# Patient Record
Sex: MALE | Race: WHITE | NOT HISPANIC OR LATINO | Employment: FULL TIME | URBAN - METROPOLITAN AREA
[De-identification: names, ages, dates, MRNs, and addresses within clinical notes are randomized per-mention and may not be internally consistent; named-entity substitution may affect disease eponyms.]

---

## 2022-04-06 ENCOUNTER — TELEPHONE (OUTPATIENT)
Dept: SCHEDULING | Facility: IMAGING CENTER | Age: 27
End: 2022-04-06

## 2022-04-18 ENCOUNTER — OFFICE VISIT (OUTPATIENT)
Dept: MEDICAL GROUP | Facility: PHYSICIAN GROUP | Age: 27
End: 2022-04-18
Payer: COMMERCIAL

## 2022-04-18 VITALS
DIASTOLIC BLOOD PRESSURE: 80 MMHG | HEART RATE: 69 BPM | HEIGHT: 74 IN | WEIGHT: 198 LBS | BODY MASS INDEX: 25.41 KG/M2 | SYSTOLIC BLOOD PRESSURE: 134 MMHG | TEMPERATURE: 97.7 F | OXYGEN SATURATION: 95 %

## 2022-04-18 DIAGNOSIS — R03.0 ELEVATED BLOOD PRESSURE READING IN OFFICE WITHOUT DIAGNOSIS OF HYPERTENSION: ICD-10-CM

## 2022-04-18 DIAGNOSIS — L65.9 HAIR LOSS: ICD-10-CM

## 2022-04-18 DIAGNOSIS — F41.9 ANXIETY AND DEPRESSION: ICD-10-CM

## 2022-04-18 DIAGNOSIS — Z00.00 HEALTHCARE MAINTENANCE: ICD-10-CM

## 2022-04-18 DIAGNOSIS — Z23 NEED FOR VACCINATION: ICD-10-CM

## 2022-04-18 DIAGNOSIS — F42.2 MIXED OBSESSIONAL THOUGHTS AND ACTS: ICD-10-CM

## 2022-04-18 DIAGNOSIS — F32.A ANXIETY AND DEPRESSION: ICD-10-CM

## 2022-04-18 PROBLEM — F42.9 OCD (OBSESSIVE COMPULSIVE DISORDER): Status: ACTIVE | Noted: 2022-04-18

## 2022-04-18 PROCEDURE — 99204 OFFICE O/P NEW MOD 45 MIN: CPT | Mod: 25

## 2022-04-18 PROCEDURE — 90471 IMMUNIZATION ADMIN: CPT

## 2022-04-18 PROCEDURE — 90716 VAR VACCINE LIVE SUBQ: CPT

## 2022-04-18 RX ORDER — FINASTERIDE 5 MG/1
5 TABLET, FILM COATED ORAL DAILY
COMMUNITY

## 2022-04-18 RX ORDER — ESCITALOPRAM OXALATE 20 MG/1
TABLET ORAL
COMMUNITY
Start: 2022-04-11

## 2022-04-18 ASSESSMENT — PATIENT HEALTH QUESTIONNAIRE - PHQ9: CLINICAL INTERPRETATION OF PHQ2 SCORE: 0

## 2022-04-18 NOTE — PROGRESS NOTES
"Subjective:     CC:  Diagnoses of Need for vaccination, Anxiety and depression, Mixed obsessional thoughts and acts, Hair loss, Healthcare maintenance, and Elevated blood pressure reading in office without diagnosis of hypertension were pertinent to this visit.    HISTORY OF THE PRESENT ILLNESS: Patient is a 26 y.o. male. This pleasant patient is here today to establish care and discuss the following problems:    Problem   Anxiety and Depression    -Chronic stable condition. Taking Lexapro since October 2020, reports fatigue as a side effect but states it is manageable.  Denies suicidal ideations.  Amendable to referral to behavioral counseling.     Ocd (Obsessive Compulsive Disorder)    -Chronic stable condition, taking Lexapro 20mg daily.  Doing well on this medication other than occasional fatigue.  He is managed by a psychiatrist in [place].     Hair Loss    Receding hairline.  Takes finasteride 5 mg daily.  No side effects reported.       Elevated Blood Pressure Reading in Office Without Diagnosis of Hypertension    -This is a new condition.  Patient reports he does not recall ever having blood pressure as high as the reading in clinic today which is 134/80.  He denies any symptomology.  He does not take any medication nor does he recall any family history of high blood pressure at a young age.         Health Maintenance: Varicella zoster vaccine given    ROS:   Review of Systems   All other systems reviewed and are negative.        Objective:     Exam: /80 (BP Location: Left arm, Patient Position: Sitting, BP Cuff Size: Adult)   Pulse 69   Temp 36.5 °C (97.7 °F) (Temporal)   Ht 1.88 m (6' 2\")   Wt 89.8 kg (198 lb)   SpO2 95%  Body mass index is 25.42 kg/m².    Physical Exam  Constitutional:       Appearance: Normal appearance. He is normal weight.   HENT:      Head: Normocephalic and atraumatic.      Right Ear: Tympanic membrane normal.      Left Ear: Tympanic membrane normal.      Nose: Nose " normal.      Mouth/Throat:      Mouth: Mucous membranes are moist.      Pharynx: Oropharynx is clear.   Eyes:      Extraocular Movements: Extraocular movements intact.      Conjunctiva/sclera: Conjunctivae normal.      Pupils: Pupils are equal, round, and reactive to light.   Cardiovascular:      Rate and Rhythm: Normal rate and regular rhythm.      Pulses: Normal pulses.      Heart sounds: Normal heart sounds.   Pulmonary:      Effort: Pulmonary effort is normal.      Breath sounds: Normal breath sounds.   Abdominal:      General: Abdomen is flat. Bowel sounds are normal.      Palpations: Abdomen is soft.   Musculoskeletal:         General: Normal range of motion.      Cervical back: Normal range of motion and neck supple.   Skin:     General: Skin is warm and dry.      Capillary Refill: Capillary refill takes less than 2 seconds.   Neurological:      General: No focal deficit present.      Mental Status: He is alert.   Psychiatric:         Mood and Affect: Mood normal.         Behavior: Behavior normal.     Labs: No current labs to review    Assessment & Plan: Medical Decision Making   26 y.o. male with the following -    Problem List Items Addressed This Visit     Anxiety and depression     -Chronic stable condition  -Continue to take Lexapro 20 mg daily.  Take at nighttime to decrease fatigue.  -Referral to behavioral health  -ED precautions given and known for suicidal ideations           Relevant Medications    escitalopram (LEXAPRO) 20 MG tablet    Other Relevant Orders    Referral to Behavioral Health    OCD (obsessive compulsive disorder)     -Chronic stable condition  -Continue to take Lexapro 20 mg daily as prescribed  -Referral to behavioral health         Relevant Medications    escitalopram (LEXAPRO) 20 MG tablet    Other Relevant Orders    Referral to Behavioral Health    Hair loss     -Chronic stable condition  -Continue to take finasteride 5 mg daily as prescribed for hair loss           Elevated  blood pressure reading in office without diagnosis of hypertension     -This is a new condition with an uncertain prognosis  -Recommend to take blood pressure readings twice daily both a.m. and p.m. with verified device.  Patient instructed on how to properly take blood pressure at home.  -Patient will take readings for 1 week and return to clinic if readings above 140/90  -Dietary salt restrictions discussed along with healthy eating and exercise           Other Visit Diagnoses     Need for vaccination        Relevant Orders    Varicella Vaccine SQ (Completed)    Healthcare maintenance        Relevant Orders    Varicella Vaccine SQ (Completed)    CBC WITHOUT DIFFERENTIAL    Comp Metabolic Panel    Lipid Profile    TSH WITH REFLEX TO FT4    VITAMIN D,25 HYDROXY    HEMOGLOBIN A1C    FOLATE    VITAMIN B12          Differential diagnosis, natural history, supportive care, and indications for immediate follow-up discussed.  Shared decision making approach was utilized, and patient is amendable with plan of care.  Patient understands to return to clinic or go to the emergency department if symptoms worsen. All questions and concerns addressed.      Return in about 6 months (around 10/18/2022).  Return in 4 weeks if blood pressure readings greater than 140/90    Please note that this dictation was created using voice recognition software. I have made every reasonable attempt to correct obvious errors, but I expect that there are errors of grammar and possibly content that I did not discover before finalizing the note.

## 2022-04-19 ENCOUNTER — HOSPITAL ENCOUNTER (OUTPATIENT)
Dept: LAB | Facility: MEDICAL CENTER | Age: 27
End: 2022-04-19
Payer: COMMERCIAL

## 2022-04-19 DIAGNOSIS — Z00.00 HEALTHCARE MAINTENANCE: ICD-10-CM

## 2022-04-19 LAB
25(OH)D3 SERPL-MCNC: 19 NG/ML (ref 30–100)
ALBUMIN SERPL BCP-MCNC: 4.6 G/DL (ref 3.2–4.9)
ALBUMIN/GLOB SERPL: 1.9 G/DL
ALP SERPL-CCNC: 70 U/L (ref 30–99)
ALT SERPL-CCNC: 26 U/L (ref 2–50)
ANION GAP SERPL CALC-SCNC: 11 MMOL/L (ref 7–16)
AST SERPL-CCNC: 32 U/L (ref 12–45)
BILIRUB SERPL-MCNC: 0.4 MG/DL (ref 0.1–1.5)
BUN SERPL-MCNC: 15 MG/DL (ref 8–22)
CALCIUM SERPL-MCNC: 9.2 MG/DL (ref 8.5–10.5)
CHLORIDE SERPL-SCNC: 104 MMOL/L (ref 96–112)
CHOLEST SERPL-MCNC: 153 MG/DL (ref 100–199)
CO2 SERPL-SCNC: 27 MMOL/L (ref 20–33)
CREAT SERPL-MCNC: 1.09 MG/DL (ref 0.5–1.4)
ERYTHROCYTE [DISTWIDTH] IN BLOOD BY AUTOMATED COUNT: 38.6 FL (ref 35.9–50)
EST. AVERAGE GLUCOSE BLD GHB EST-MCNC: 97 MG/DL
FASTING STATUS PATIENT QL REPORTED: NORMAL
FOLATE SERPL-MCNC: 18.2 NG/ML
GFR SERPLBLD CREATININE-BSD FMLA CKD-EPI: 95 ML/MIN/1.73 M 2
GLOBULIN SER CALC-MCNC: 2.4 G/DL (ref 1.9–3.5)
GLUCOSE SERPL-MCNC: 90 MG/DL (ref 65–99)
HBA1C MFR BLD: 5 % (ref 4–5.6)
HCT VFR BLD AUTO: 45.7 % (ref 42–52)
HDLC SERPL-MCNC: 46 MG/DL
HGB BLD-MCNC: 15.8 G/DL (ref 14–18)
LDLC SERPL CALC-MCNC: 85 MG/DL
MCH RBC QN AUTO: 30 PG (ref 27–33)
MCHC RBC AUTO-ENTMCNC: 34.6 G/DL (ref 33.7–35.3)
MCV RBC AUTO: 86.7 FL (ref 81.4–97.8)
PLATELET # BLD AUTO: 231 K/UL (ref 164–446)
PMV BLD AUTO: 9.4 FL (ref 9–12.9)
POTASSIUM SERPL-SCNC: 4.2 MMOL/L (ref 3.6–5.5)
PROT SERPL-MCNC: 7 G/DL (ref 6–8.2)
RBC # BLD AUTO: 5.27 M/UL (ref 4.7–6.1)
SODIUM SERPL-SCNC: 142 MMOL/L (ref 135–145)
TRIGL SERPL-MCNC: 108 MG/DL (ref 0–149)
TSH SERPL DL<=0.005 MIU/L-ACNC: 1.56 UIU/ML (ref 0.38–5.33)
VIT B12 SERPL-MCNC: 594 PG/ML (ref 211–911)
WBC # BLD AUTO: 6 K/UL (ref 4.8–10.8)

## 2022-04-19 PROCEDURE — 80053 COMPREHEN METABOLIC PANEL: CPT

## 2022-04-19 PROCEDURE — 80061 LIPID PANEL: CPT

## 2022-04-19 PROCEDURE — 82306 VITAMIN D 25 HYDROXY: CPT

## 2022-04-19 PROCEDURE — 83036 HEMOGLOBIN GLYCOSYLATED A1C: CPT

## 2022-04-19 PROCEDURE — 85027 COMPLETE CBC AUTOMATED: CPT

## 2022-04-19 PROCEDURE — 84443 ASSAY THYROID STIM HORMONE: CPT

## 2022-04-19 PROCEDURE — 36415 COLL VENOUS BLD VENIPUNCTURE: CPT

## 2022-04-19 PROCEDURE — 82607 VITAMIN B-12: CPT

## 2022-04-19 PROCEDURE — 82746 ASSAY OF FOLIC ACID SERUM: CPT

## 2022-04-19 NOTE — ASSESSMENT & PLAN NOTE
-Chronic stable condition  -Continue to take Lexapro 20 mg daily.  Take at nighttime to decrease fatigue.  -Referral to behavioral health  -ED precautions given and known for suicidal ideations

## 2022-04-19 NOTE — ASSESSMENT & PLAN NOTE
-This is a new condition with an uncertain prognosis  -Recommend to take blood pressure readings twice daily both a.m. and p.m. with verified device.  Patient instructed on how to properly take blood pressure at home.  -Patient will take readings for 1 week and return to clinic if readings above 140/90  -Dietary salt restrictions discussed along with healthy eating and exercise

## 2022-04-19 NOTE — ASSESSMENT & PLAN NOTE
-Chronic stable condition  -Continue to take Lexapro 20 mg daily as prescribed  -Referral to behavioral health

## 2022-06-29 ENCOUNTER — OFFICE VISIT (OUTPATIENT)
Dept: MEDICAL GROUP | Facility: PHYSICIAN GROUP | Age: 27
End: 2022-06-29
Payer: COMMERCIAL

## 2022-06-29 VITALS
SYSTOLIC BLOOD PRESSURE: 102 MMHG | TEMPERATURE: 97.9 F | RESPIRATION RATE: 12 BRPM | OXYGEN SATURATION: 94 % | HEIGHT: 74 IN | WEIGHT: 202.2 LBS | BODY MASS INDEX: 25.95 KG/M2 | HEART RATE: 74 BPM | DIASTOLIC BLOOD PRESSURE: 60 MMHG

## 2022-06-29 DIAGNOSIS — R53.83 OTHER FATIGUE: ICD-10-CM

## 2022-06-29 DIAGNOSIS — R94.31 ABNORMAL EKG: ICD-10-CM

## 2022-06-29 DIAGNOSIS — R03.0 ELEVATED BLOOD PRESSURE READING IN OFFICE WITHOUT DIAGNOSIS OF HYPERTENSION: ICD-10-CM

## 2022-06-29 PROCEDURE — 93000 ELECTROCARDIOGRAM COMPLETE: CPT

## 2022-06-29 PROCEDURE — 99214 OFFICE O/P EST MOD 30 MIN: CPT

## 2022-06-29 RX ORDER — LORATADINE 10 MG/1
10 TABLET ORAL DAILY
COMMUNITY

## 2022-06-29 RX ORDER — COVID-19 MOLECULAR TEST ASSAY
KIT MISCELLANEOUS
COMMUNITY
Start: 2022-05-20 | End: 2022-06-29

## 2022-06-29 ASSESSMENT — FIBROSIS 4 INDEX: FIB4 SCORE: 0.73

## 2022-06-29 ASSESSMENT — ENCOUNTER SYMPTOMS
FEVER: 0
CHILLS: 0
SHORTNESS OF BREATH: 0
DIAPHORESIS: 0
COUGH: 1
PALPITATIONS: 1
DIZZINESS: 1

## 2022-06-29 NOTE — PROGRESS NOTES
"Subjective:     CC: BP, fatigue    HPI:   Alex presents today with    Problem   Other Fatigue    This is a chronic condition that started this patient started on SSRI Lexapro 20 mg.  He reports to get a good night sleep but oftentimes suffers from daytime fatigue.  Blood pressures have also been erratic.  In clinic today is 102/60.  Home blood pressure readings have been ranging from 130s to 40s over 70s to 80s.  His psychiatrist has recommended a sleep study.     Abnormal Ekg    Patient came in today with concerns increasing fatigue, blood pressure variations, and l multiple episodes of ightheadedness and dizziness at the gym within the past month.  EKG obtained.     Elevated Blood Pressure Reading in Office Without Diagnosis of Hypertension    Home blood pressure readings range from 130-140/70-80, rare 150/90s   -Blood pressure today in clinic 102/60.  Rechecked 15 minutes 138/77           Health Maintenance: Recommend follow-up for chickenpox vaccine    ROS:  Review of Systems   Constitutional: Positive for malaise/fatigue. Negative for chills, diaphoresis and fever.   Respiratory: Positive for cough (Chronic dry cough). Negative for shortness of breath.    Cardiovascular: Positive for palpitations. Negative for chest pain.   Neurological: Positive for dizziness (Lightheadedness for the past month on occasion at the gym).       Objective:     Exam:  /60 (BP Location: Left arm, Patient Position: Sitting, BP Cuff Size: Large adult)   Pulse 74   Temp 36.6 °C (97.9 °F) (Temporal)   Resp 12   Ht 1.88 m (6' 2\")   Wt 91.7 kg (202 lb 3.2 oz)   SpO2 94%   BMI 25.96 kg/m²  Body mass index is 25.96 kg/m².    Physical Exam  Constitutional:       General: He is not in acute distress.     Appearance: Normal appearance. He is normal weight. He is not ill-appearing or toxic-appearing.   HENT:      Head: Normocephalic and atraumatic.      Mouth/Throat:      Pharynx: No posterior oropharyngeal erythema.   Eyes:      " Extraocular Movements: Extraocular movements intact.      Conjunctiva/sclera: Conjunctivae normal.      Pupils: Pupils are equal, round, and reactive to light.   Cardiovascular:      Rate and Rhythm: Normal rate and regular rhythm.      Pulses: Normal pulses.      Heart sounds: Normal heart sounds. No murmur heard.    No friction rub. No gallop.   Pulmonary:      Effort: Pulmonary effort is normal. No respiratory distress.      Breath sounds: Normal breath sounds.   Musculoskeletal:         General: Normal range of motion.   Skin:     General: Skin is warm and dry.      Capillary Refill: Capillary refill takes less than 2 seconds.   Neurological:      General: No focal deficit present.      Mental Status: He is alert and oriented to person, place, and time.   Psychiatric:         Mood and Affect: Mood normal.         Behavior: Behavior normal.         Labs: Labs from 4/19/2022 show CBC within normal limits CMP within normal limits, lipid panel within normal limits and low vitamin D at 19, B12 folic acid within normal limits, TSH within normal limits    Assessment & Plan: Medical Decision Making     26 y.o. male with the following -     Problem List Items Addressed This Visit     Elevated blood pressure reading in office without diagnosis of hypertension     New condition of uncertain prognosis  - Continue to take blood pressures for 1 month and monitor symptoms  - EKG ordered  - Referral to cardiology for further consultation           Relevant Orders    EKG - Clinic Performed (Completed)    REFERRAL TO CARDIOLOGY    Other fatigue     Chronic condition likely induced by Lexapro  Recommend talking to psychiatrist about possibly adjusting medication or switching to another class as to reduce episodes of fatigue  - EKG done in clinic to rule out prolonged QT syndrome as this could be a side effect to Lexapro           Relevant Orders    REFERRAL TO CARDIOLOGY    Abnormal EKG     New condition of uncertain  prognosis  EKG Interpretation   Ordered and interpreted by DENIZ Hughes  Rhythm: normal sinus   Rate: 58normal   Axis: Abnormal   Ectopy: none   Conduction: Low voltage in leads III    ST Segments: Normal ST changes V2 through V5 possibly indicating old infarct  T Waves: no acute change   Q Waves: none   Clinical Impression: Sinus rhythm with possible old anterior infarct    Referral to cardiology for further consultation               Relevant Orders    REFERRAL TO CARDIOLOGY      I spent a total of greater than 30 minutes with record review, exam, communication with the patient, communication with other providers, and documentation of this encounter.      Differential diagnosis, natural history, supportive care, and indications for immediate follow-up discussed.  Shared decision making approach utilized, and patient is amendable with plan of care.  Patient understands to return to clinic or go to the emergency department if symptoms worsen. All questions and concerns addressed.    Return in about 4 weeks (around 7/27/2022) for F/U BP.    Please note that this dictation was created using voice recognition software. I have made every reasonable attempt to correct obvious errors, but I expect that there are errors of grammar and possibly content that I did not discover before finalizing the note.

## 2022-06-30 NOTE — ASSESSMENT & PLAN NOTE
Chronic condition likely induced by Lexapro  Recommend talking to psychiatrist about possibly adjusting medication or switching to another class as to reduce episodes of fatigue  - EKG done in clinic to rule out prolonged QT syndrome as this could be a side effect to Lexapro

## 2022-06-30 NOTE — ASSESSMENT & PLAN NOTE
New condition of uncertain prognosis  EKG Interpretation   Ordered and interpreted by DENIZ Hughes  Rhythm: normal sinus   Rate: 58normal   Axis: Abnormal   Ectopy: none   Conduction: Low voltage in leads III    ST Segments: Normal ST changes V2 through V5 possibly indicating old infarct  T Waves: no acute change   Q Waves: none   Clinical Impression: Sinus rhythm with possible old anterior infarct    Referral to cardiology for further consultation

## 2022-06-30 NOTE — ASSESSMENT & PLAN NOTE
New condition of uncertain prognosis  - Continue to take blood pressures for 1 month and monitor symptoms  - EKG ordered  - Referral to cardiology for further consultation

## 2022-07-20 ENCOUNTER — OFFICE VISIT (OUTPATIENT)
Dept: CARDIOLOGY | Facility: MEDICAL CENTER | Age: 27
End: 2022-07-20
Payer: COMMERCIAL

## 2022-07-20 VITALS
BODY MASS INDEX: 26.05 KG/M2 | DIASTOLIC BLOOD PRESSURE: 80 MMHG | SYSTOLIC BLOOD PRESSURE: 126 MMHG | WEIGHT: 203 LBS | RESPIRATION RATE: 18 BRPM | OXYGEN SATURATION: 96 % | HEART RATE: 72 BPM | HEIGHT: 74 IN

## 2022-07-20 DIAGNOSIS — R03.0 ELEVATED BLOOD PRESSURE READING IN OFFICE WITHOUT DIAGNOSIS OF HYPERTENSION: ICD-10-CM

## 2022-07-20 DIAGNOSIS — R53.83 OTHER FATIGUE: ICD-10-CM

## 2022-07-20 DIAGNOSIS — R42 DIZZINESS: ICD-10-CM

## 2022-07-20 PROCEDURE — 99204 OFFICE O/P NEW MOD 45 MIN: CPT | Performed by: INTERNAL MEDICINE

## 2022-07-20 ASSESSMENT — ENCOUNTER SYMPTOMS
NAUSEA: 0
VOMITING: 0
LOSS OF BALANCE: 0
PALPITATIONS: 0
DIAPHORESIS: 0
SORE THROAT: 0
DIZZINESS: 0
ORTHOPNEA: 0
SYNCOPE: 0
COUGH: 0
FEVER: 0
FLANK PAIN: 0
IRREGULAR HEARTBEAT: 0
FALLS: 0
NEAR-SYNCOPE: 0
LIGHT-HEADEDNESS: 0
BACK PAIN: 0
SLEEP DISTURBANCES DUE TO BREATHING: 0
NIGHT SWEATS: 0
WEAKNESS: 0
WHEEZING: 0
CONSTIPATION: 0
PARESTHESIAS: 0
BLURRED VISION: 0
DYSPNEA ON EXERTION: 0
DIARRHEA: 0
MYALGIAS: 0
SHORTNESS OF BREATH: 0
EXCESSIVE DAYTIME SLEEPINESS: 0
NUMBNESS: 0
HEADACHES: 0
PND: 0
DOUBLE VISION: 0
DECREASED APPETITE: 0
BLOATING: 0

## 2022-07-20 ASSESSMENT — FIBROSIS 4 INDEX: FIB4 SCORE: 0.73

## 2022-07-20 NOTE — PROGRESS NOTES
Cardiology Initial Consultation Note    Date of note:    7/20/2022    Primary Care Provider: Rajat Bailey D.N.P.  Referring Provider: Rajat Bailey D.N.P.     Patient Name: Alex Leung   YOB: 1995  MRN:              [id number]    Chief Complaint   Patient presents with   • Establish Care     F?V Dx: Elevated blood pressure reading in office without diagnosis of hypertension   • Fatigue       History of Present Illness: Mr. Alex Leung is a 26 y.o. male whose current medical problems include obesity and anxiety who is here for cardiac consultation for elevated blood pressure reading and abnormal ECG.    Patient states that he has noted elevated blood pressure readings at primary care's office and at home.  Was being worked up for sleep apnea, had an EKG and was told that he has had a previous heart attack.  Overall, reports doing well from a cardiovascular perspective.  No episodes of chest pain, pressure or dyspnea.  Has noted ongoing fatigue which started after he was started on Lexapro.      Has family history of hypertension in his father.  Checks his BP at home on a daily basis with a wrist cuff with occasional numbers of 150/90.    In terms of physical activity, exercises on a regular basis with weight lifting.  Does occasional cardio.  Takes no preworkout supplements except for creatinine and drinks caffeine in moderation.    Cardiovascular Risk Factors:  1. Smoking status: Never smoker  2. Type II Diabetes Mellitus: No  Lab Results   Component Value Date/Time    HBA1C 5.0 04/19/2022 01:49 PM     3. Hypertension: No  4. Dyslipidemia: No  Cholesterol,Tot   Date Value Ref Range Status   04/19/2022 153 100 - 199 mg/dL Final     LDL   Date Value Ref Range Status   04/19/2022 85 <100 mg/dL Final     HDL   Date Value Ref Range Status   04/19/2022 46 >=40 mg/dL Final     Triglycerides   Date Value Ref Range Status   04/19/2022 108 0 - 149 mg/dL Final     5. Family history of early  Coronary Artery Disease in a first degree relative (Male less than 55 years of age; Female less than 65 years of age): Father had CABG in his 30s  6.  Obesity and/or Metabolic Syndrome: No  7. Sedentary lifestyle: No    Review of Systems   Constitutional: Positive for malaise/fatigue. Negative for decreased appetite, diaphoresis, fever and night sweats.   HENT: Negative for congestion and sore throat.    Eyes: Negative for blurred vision and double vision.   Cardiovascular: Negative for chest pain, cyanosis, dyspnea on exertion, irregular heartbeat, leg swelling, near-syncope, orthopnea, palpitations, paroxysmal nocturnal dyspnea and syncope.   Respiratory: Negative for cough, shortness of breath, sleep disturbances due to breathing and wheezing.    Endocrine: Negative for cold intolerance and heat intolerance.   Musculoskeletal: Negative for back pain, falls and myalgias.   Gastrointestinal: Negative for bloating, constipation, diarrhea, nausea and vomiting.   Genitourinary: Negative for dysuria and flank pain.   Neurological: Negative for excessive daytime sleepiness, dizziness, headaches, light-headedness, loss of balance, numbness, paresthesias and weakness.       Past Medical History:   Diagnosis Date   • Allergy    • Anxiety    • Depression    • OCD (obsessive compulsive disorder)          Past Surgical History:   Procedure Laterality Date   • TONSILLECTOMY           Current Outpatient Medications   Medication Sig Dispense Refill   • loratadine (CLARITIN) 10 MG Tab Take 10 mg by mouth every day.     • Multiple Vitamin (MULTI-VITAMIN) Tab Take 1 Tablet by mouth every day.     • Creatine Powder Take 1 Scoop by mouth every day.     • escitalopram (LEXAPRO) 20 MG tablet      • finasteride (PROSCAR) 5 MG Tab Take 5 mg by mouth every day.       No current facility-administered medications for this visit.         No Known Allergies      Family History   Problem Relation Age of Onset   • Anxiety disorder Mother    •  "Lung Disease Father    • Cancer Father    • Anxiety disorder Sister    • No Known Problems Maternal Grandmother    • Heart Disease Maternal Grandfather    • Breast Cancer Paternal Grandmother    • Cancer Paternal Grandfather    • Anxiety disorder Sister          Social History     Socioeconomic History   • Marital status: Single     Spouse name: Not on file   • Number of children: Not on file   • Years of education: Not on file   • Highest education level: Not on file   Occupational History   • Not on file   Tobacco Use   • Smoking status: Never Smoker   • Smokeless tobacco: Never Used   Vaping Use   • Vaping Use: Never used   Substance and Sexual Activity   • Alcohol use: Never   • Drug use: Never   • Sexual activity: Yes     Partners: Female     Birth control/protection: I.U.D.   Other Topics Concern   • Not on file   Social History Narrative   • Not on file     Social Determinants of Health     Financial Resource Strain: Not on file   Food Insecurity: Not on file   Transportation Needs: Not on file   Physical Activity: Not on file   Stress: Not on file   Social Connections: Not on file   Intimate Partner Violence: Not on file   Housing Stability: Not on file         Physical Exam:  Ambulatory Vitals  /80 (BP Location: Left arm, Patient Position: Sitting, BP Cuff Size: Adult)   Pulse 72   Resp 18   Ht 1.88 m (6' 2\")   Wt 92.1 kg (203 lb)   SpO2 96%    Oxygen Therapy:  Pulse Oximetry: 96 %  BP Readings from Last 4 Encounters:   07/20/22 126/80   06/29/22 102/60   04/18/22 134/80       Weight/BMI: Body mass index is 26.06 kg/m².  Wt Readings from Last 4 Encounters:   07/20/22 92.1 kg (203 lb)   06/29/22 91.7 kg (202 lb 3.2 oz)   04/18/22 89.8 kg (198 lb)       General: No acute distress. Well nourished.  HEENT: EOM grossly intact, no scleral icterus, no pharyngeal erythema.   Neck:  Trachea midline, no visible masses  CVS: RRR. No JVD at 90  Resp: Normal respiratory effort. Normal appearing " chest.  Abdomen: Not overtly obese.  MSK/Ext: No clubbing or cyanosis.  Skin: Dry appearing, no rashes.  Neurological: CN III-XII grossly intact. No focal deficits.   Psych: A&O x 3, appropriate affect, good judgement    Lab Data Review:  Lab Results   Component Value Date/Time    CHOLSTRLTOT 153 04/19/2022 01:49 PM    LDL 85 04/19/2022 01:49 PM    HDL 46 04/19/2022 01:49 PM    TRIGLYCERIDE 108 04/19/2022 01:49 PM       Lab Results   Component Value Date/Time    SODIUM 142 04/19/2022 01:49 PM    POTASSIUM 4.2 04/19/2022 01:49 PM    CHLORIDE 104 04/19/2022 01:49 PM    CO2 27 04/19/2022 01:49 PM    GLUCOSE 90 04/19/2022 01:49 PM    BUN 15 04/19/2022 01:49 PM    CREATININE 1.09 04/19/2022 01:49 PM     Lab Results   Component Value Date/Time    ALKPHOSPHAT 70 04/19/2022 01:49 PM    ASTSGOT 32 04/19/2022 01:49 PM    ALTSGPT 26 04/19/2022 01:49 PM    TBILIRUBIN 0.4 04/19/2022 01:49 PM      Lab Results   Component Value Date/Time    WBC 6.0 04/19/2022 01:49 PM     Lab Results   Component Value Date/Time    HBA1C 5.0 04/19/2022 01:49 PM         Cardiac Imaging and Procedures Review:    EKG dated 6/29/2022: My personal interpretation is sinus rhythm        Assessment & Plan     1. Elevated blood pressure reading in office without diagnosis of hypertension  EC-ECHOCARDIOGRAM COMPLETE W/O CONT   2. Other fatigue  EC-ECHOCARDIOGRAM COMPLETE W/O CONT   3. Dizziness  EC-ECHOCARDIOGRAM COMPLETE W/O CONT         Shared Medical Decision Making:  Blood pressure within normal limits in the clinic today.  Reviewed BP at prior clinic visits which are all below 140/80.  Discussed purchasing arm cuff rather than wrist cuff for accurate readings.  Goal BP less than 140/80.  Had extensive discussion regarding lifestyle changes with low-salt diet and initiating cardio focused exercise routine to prevent hypertension in the future.    For ongoing fatigue and episodes of dizziness, obtain transthoracic echocardiogram to evaluate underlying  cardiac structure and function.  Rule out LVH and aortic aneurysm given daily weight lifting, occasional elevated blood pressure and family history.      All of patient's excellent questions were answered to the best of my knowledge and to his satisfaction.  It was a pleasure seeing Mr. Alex Leung in my clinic today. RTC if abnormal test results otherwise as needed.  Patient is aware to call the cardiology clinic with any questions or concerns.      Eros José MD  [facility]  [address] [address]  Phone: [phone]  Fax: [phone]    Please note that this dictation was created using voice recognition software. I have made every reasonable attempt to correct obvious errors, but it is possible there are errors of grammar and possibly content that I did not discover before finalizing the note.

## 2022-10-26 ENCOUNTER — APPOINTMENT (OUTPATIENT)
Dept: CARDIOLOGY | Facility: MEDICAL CENTER | Age: 27
End: 2022-10-26
Attending: INTERNAL MEDICINE
Payer: COMMERCIAL

## 2022-12-02 ENCOUNTER — APPOINTMENT (OUTPATIENT)
Dept: CARDIOLOGY | Facility: MEDICAL CENTER | Age: 27
End: 2022-12-02
Attending: INTERNAL MEDICINE
Payer: COMMERCIAL

## 2022-12-05 ENCOUNTER — HOSPITAL ENCOUNTER (OUTPATIENT)
Dept: CARDIOLOGY | Facility: MEDICAL CENTER | Age: 27
End: 2022-12-05
Attending: INTERNAL MEDICINE
Payer: COMMERCIAL

## 2022-12-05 DIAGNOSIS — R42 DIZZINESS: ICD-10-CM

## 2022-12-05 DIAGNOSIS — R53.83 OTHER FATIGUE: ICD-10-CM

## 2022-12-05 DIAGNOSIS — R03.0 ELEVATED BLOOD PRESSURE READING IN OFFICE WITHOUT DIAGNOSIS OF HYPERTENSION: ICD-10-CM

## 2022-12-05 PROCEDURE — 93306 TTE W/DOPPLER COMPLETE: CPT

## 2022-12-06 LAB
LV EJECT FRACT  99904: 60
LV EJECT FRACT MOD 2C 99903: 60.96
LV EJECT FRACT MOD 4C 99902: 61.22
LV EJECT FRACT MOD BP 99901: 54.13

## 2022-12-06 PROCEDURE — 93306 TTE W/DOPPLER COMPLETE: CPT | Mod: 26 | Performed by: INTERNAL MEDICINE

## 2023-04-27 ENCOUNTER — OFFICE VISIT (OUTPATIENT)
Dept: MEDICAL GROUP | Facility: PHYSICIAN GROUP | Age: 28
End: 2023-04-27
Payer: COMMERCIAL

## 2023-04-27 VITALS
TEMPERATURE: 96.6 F | BODY MASS INDEX: 25.31 KG/M2 | OXYGEN SATURATION: 95 % | DIASTOLIC BLOOD PRESSURE: 60 MMHG | HEART RATE: 53 BPM | WEIGHT: 197.2 LBS | SYSTOLIC BLOOD PRESSURE: 90 MMHG | HEIGHT: 74 IN

## 2023-04-27 DIAGNOSIS — Z91.09 ENVIRONMENTAL ALLERGIES: ICD-10-CM

## 2023-04-27 DIAGNOSIS — R05.3 CHRONIC COUGH: ICD-10-CM

## 2023-04-27 PROCEDURE — 99214 OFFICE O/P EST MOD 30 MIN: CPT

## 2023-04-27 RX ORDER — FLUTICASONE PROPIONATE 50 MCG
1 SPRAY, SUSPENSION (ML) NASAL DAILY
Qty: 18.2 ML | Refills: 1 | Status: SHIPPED | OUTPATIENT
Start: 2023-04-27

## 2023-04-27 RX ORDER — AZELASTINE 1 MG/ML
1 SPRAY, METERED NASAL 2 TIMES DAILY
Qty: 30 ML | Refills: 1 | Status: SHIPPED | OUTPATIENT
Start: 2023-04-27 | End: 2023-08-31

## 2023-04-27 RX ORDER — ALBUTEROL SULFATE 90 UG/1
2 AEROSOL, METERED RESPIRATORY (INHALATION) EVERY 4 HOURS PRN
Qty: 1 EACH | Refills: 1 | Status: SHIPPED | OUTPATIENT
Start: 2023-04-27

## 2023-04-27 ASSESSMENT — ENCOUNTER SYMPTOMS
ABDOMINAL PAIN: 0
BLURRED VISION: 0
VOMITING: 0
FEVER: 0
WEAKNESS: 0
WEIGHT LOSS: 0
COUGH: 1
CHILLS: 0
WHEEZING: 0
DIARRHEA: 0
MYALGIAS: 0
CONSTIPATION: 0
SHORTNESS OF BREATH: 0
SPUTUM PRODUCTION: 0
HEADACHES: 0
NAUSEA: 0
DIZZINESS: 0

## 2023-04-27 ASSESSMENT — PATIENT HEALTH QUESTIONNAIRE - PHQ9
CLINICAL INTERPRETATION OF PHQ2 SCORE: 2
SUM OF ALL RESPONSES TO PHQ QUESTIONS 1-9: 4
5. POOR APPETITE OR OVEREATING: 0 - NOT AT ALL

## 2023-04-27 ASSESSMENT — FIBROSIS 4 INDEX: FIB4 SCORE: 0.76

## 2023-04-27 NOTE — PROGRESS NOTES
"Subjective:     CC: cough    HPI:   Alex presents today with    Problem   Chronic Cough    Reports dry-non productive persistent cough since January 2023 with Covid. Did also have some SOB initially. S/O reports he infact has had cough for years.  Dad has a similar cough per patient.   -Taking Claritin consistently for the past week due to increased allergic response to environmental allergens. Feels this may slightly be helping cough.  -Cough worse in the AM and with laying down, denies wheezing and SOB at this time.  -Rare episodes of GERD  -History of childhood asthma     Environmental Allergies    Taking Claritin for the past week.  This has been somewhat helpful.  Also suffering from chronic cough since January 2023 after COVID.  Used to have severe allergies in the past for which we get steroid shots.         Health Maintenance: Completed    ROS:  Review of Systems   Constitutional:  Negative for chills, fever, malaise/fatigue and weight loss.   Eyes:  Negative for blurred vision.   Respiratory:  Positive for cough. Negative for sputum production, shortness of breath and wheezing.    Cardiovascular:  Negative for chest pain.   Gastrointestinal:  Negative for abdominal pain, constipation, diarrhea, nausea and vomiting.   Musculoskeletal:  Negative for myalgias.   Neurological:  Negative for dizziness, weakness and headaches.     Objective:     Exam:  BP 90/60 (BP Location: Left arm, Patient Position: Sitting, BP Cuff Size: Adult)   Pulse (!) 53   Temp 35.9 °C (96.6 °F) (Temporal)   Ht 1.88 m (6' 2\")   Wt 89.4 kg (197 lb 3.2 oz)   SpO2 95%   BMI 25.32 kg/m²  Body mass index is 25.32 kg/m².    Physical Exam  Constitutional:       Appearance: Normal appearance.   HENT:      Head: Normocephalic.   Eyes:      Conjunctiva/sclera: Conjunctivae normal.      Pupils: Pupils are equal, round, and reactive to light.   Cardiovascular:      Rate and Rhythm: Normal rate and regular rhythm.      Heart sounds: No murmur " heard.  Pulmonary:      Effort: Pulmonary effort is normal. No respiratory distress.      Breath sounds: Normal breath sounds. No wheezing, rhonchi or rales.      Comments: Lung sounds decreased in the bases bilaterally  Musculoskeletal:         General: Normal range of motion.   Skin:     General: Skin is warm and dry.   Neurological:      General: No focal deficit present.      Mental Status: He is alert and oriented to person, place, and time.   Psychiatric:         Mood and Affect: Mood normal.         Behavior: Behavior normal.       Labs: No recent labs    Assessment & Plan: Medical Decision Making     27 y.o. male with the following -     1. Chronic cough  Undiagnosed condition of uncertain prognosis  -Strong evidence that this could quite possibly be recurrence of allergic response to environmental allergens with possible reoccurrence of childhood asthma  -ED precautions given and known for worsening or progression of this condition including any shortness of breath or wheezing uncontrolled by albuterol  - DX-CHEST-2 VIEWS; Future  - albuterol 108 (90 Base) MCG/ACT Aero Soln inhalation aerosol; Inhale 2 Puffs every four hours as needed for Shortness of Breath.  Dispense: 1 Each; Refill: 1  - CBC WITH DIFFERENTIAL; Future    2. Environmental allergies  Chronic condition uncontrolled  Tips for allergy relief:    Try to avoid allergens:   Avoid being outside during peak allergy hours and during strong winds  Take shoes off when you come indoors so you are not tracking pollen through the home.  Keep windows and doors closed and use air conditioning to cool the home.  Shower, shampoo, and scrub eyebrows and eyelashes before bed.  Use OTC Tab Med Sinus Rinse (YouTube video as discussed) followed by intranasal corticosteroid such as Flonase or Nasocort, in addition you may try Astepro Allergy  Medicines are more effective with daily use.  Try Zyrtec or Allegra tablets daily (OTC).  OTC allergy eye drops such as  zaditor or naphcon as needed.   During peak allergy seasons, you may find that no one medicine provides complete relief and multiple medicines are needed.     - fluticasone (FLONASE) 50 MCG/ACT nasal spray; Administer 1 Spray into affected nostril(S) every day.  Dispense: 18.2 mL; Refill: 1  - azelastine (ASTELIN) 137 MCG/SPRAY nasal spray; Administer 1 Spray into affected nostril(S) 2 times a day.  Dispense: 30 mL; Refill: 1  - CBC WITH DIFFERENTIAL; Future      Differential diagnosis, natural history, supportive care, and indications for immediate follow-up discussed.  Shared decision making approach utilized, and patient is amendable with plan of care.  Patient understands to return to clinic or go to the emergency department if symptoms worsen. All questions and concerns addressed to the best of my knowledge.    Return if symptoms worsen or fail to improve.    Please note that this dictation was created using voice recognition software. I have made every reasonable attempt to correct obvious errors, but I expect that there are errors of grammar and possibly content that I did not discover before finalizing the note.

## 2023-04-27 NOTE — ASSESSMENT & PLAN NOTE
Chronic condition uncontrolled  -Tips for allergy relief:  Try to avoid allergens:   Avoid being outside during peak allergy hours and during strong winds  Take shoes off when you come indoors so you are not tracking pollen through the home.  Keep windows and doors closed and use air conditioning to cool the home.  Shower, shampoo, and scrub eyebrows and eyelashes before bed.  Use OTC Tab Med Sinus Rinse (YouTube video as discussed) followed by intranasal corticosteroid such as Flonase or Nasocort, in addition you may try Astepro Allergy  Medicines are more effective with daily use.  Try Zyrtec or Allegra tablets daily (OTC).  OTC allergy eye drops such as zaditor or naphcon as needed.   During peak allergy seasons, you may find that no one medicine provides complete relief and multiple medicines are needed.      patient/chart(s) patient/chart(s)/HCP daughter Jessica Saul [phone]

## 2023-05-08 ENCOUNTER — HOSPITAL ENCOUNTER (OUTPATIENT)
Dept: RADIOLOGY | Facility: MEDICAL CENTER | Age: 28
End: 2023-05-08
Payer: COMMERCIAL

## 2023-05-08 ENCOUNTER — HOSPITAL ENCOUNTER (OUTPATIENT)
Dept: LAB | Facility: MEDICAL CENTER | Age: 28
End: 2023-05-08
Payer: COMMERCIAL

## 2023-05-08 DIAGNOSIS — R05.3 CHRONIC COUGH: ICD-10-CM

## 2023-05-08 DIAGNOSIS — Z91.09 ENVIRONMENTAL ALLERGIES: ICD-10-CM

## 2023-05-08 LAB
BASOPHILS # BLD AUTO: 1.3 % (ref 0–1.8)
BASOPHILS # BLD: 0.08 K/UL (ref 0–0.12)
EOSINOPHIL # BLD AUTO: 0.61 K/UL (ref 0–0.51)
EOSINOPHIL NFR BLD: 10.3 % (ref 0–6.9)
ERYTHROCYTE [DISTWIDTH] IN BLOOD BY AUTOMATED COUNT: 42 FL (ref 35.9–50)
HCT VFR BLD AUTO: 50.3 % (ref 42–52)
HGB BLD-MCNC: 17 G/DL (ref 14–18)
IMM GRANULOCYTES # BLD AUTO: 0.01 K/UL (ref 0–0.11)
IMM GRANULOCYTES NFR BLD AUTO: 0.2 % (ref 0–0.9)
LYMPHOCYTES # BLD AUTO: 1.89 K/UL (ref 1–4.8)
LYMPHOCYTES NFR BLD: 31.8 % (ref 22–41)
MCH RBC QN AUTO: 30 PG (ref 27–33)
MCHC RBC AUTO-ENTMCNC: 33.8 G/DL (ref 33.7–35.3)
MCV RBC AUTO: 88.7 FL (ref 81.4–97.8)
MONOCYTES # BLD AUTO: 0.6 K/UL (ref 0–0.85)
MONOCYTES NFR BLD AUTO: 10.1 % (ref 0–13.4)
NEUTROPHILS # BLD AUTO: 2.75 K/UL (ref 1.82–7.42)
NEUTROPHILS NFR BLD: 46.3 % (ref 44–72)
NRBC # BLD AUTO: 0 K/UL
NRBC BLD-RTO: 0 /100 WBC
PLATELET # BLD AUTO: 283 K/UL (ref 164–446)
PMV BLD AUTO: 9.5 FL (ref 9–12.9)
RBC # BLD AUTO: 5.67 M/UL (ref 4.7–6.1)
WBC # BLD AUTO: 5.9 K/UL (ref 4.8–10.8)

## 2023-05-08 PROCEDURE — 71046 X-RAY EXAM CHEST 2 VIEWS: CPT

## 2023-05-08 PROCEDURE — 36415 COLL VENOUS BLD VENIPUNCTURE: CPT

## 2023-05-08 PROCEDURE — 85025 COMPLETE CBC W/AUTO DIFF WBC: CPT

## 2023-08-31 DIAGNOSIS — Z91.09 ENVIRONMENTAL ALLERGIES: ICD-10-CM

## 2023-08-31 RX ORDER — AZELASTINE HYDROCHLORIDE 137 UG/1
1 SPRAY, METERED NASAL 2 TIMES DAILY
Qty: 30 ML | Refills: 0 | Status: SHIPPED | OUTPATIENT
Start: 2023-08-31 | End: 2023-09-25

## 2023-09-24 DIAGNOSIS — Z91.09 ENVIRONMENTAL ALLERGIES: ICD-10-CM

## 2023-09-25 RX ORDER — AZELASTINE HYDROCHLORIDE 137 UG/1
1 SPRAY, METERED NASAL 2 TIMES DAILY
Qty: 30 ML | Refills: 2 | Status: SHIPPED | OUTPATIENT
Start: 2023-09-25

## 2024-10-02 ENCOUNTER — APPOINTMENT (OUTPATIENT)
Dept: MEDICAL GROUP | Facility: PHYSICIAN GROUP | Age: 29
End: 2024-10-02
Payer: COMMERCIAL

## 2024-10-02 VITALS
SYSTOLIC BLOOD PRESSURE: 120 MMHG | HEIGHT: 74 IN | WEIGHT: 186.2 LBS | HEART RATE: 67 BPM | DIASTOLIC BLOOD PRESSURE: 70 MMHG | BODY MASS INDEX: 23.9 KG/M2 | TEMPERATURE: 97.6 F | OXYGEN SATURATION: 94 %

## 2024-10-02 DIAGNOSIS — R53.82 CHRONIC FATIGUE: ICD-10-CM

## 2024-10-02 DIAGNOSIS — Z00.00 HEALTHCARE MAINTENANCE: ICD-10-CM

## 2024-10-02 DIAGNOSIS — Z13.6 SCREENING FOR CARDIOVASCULAR CONDITION: ICD-10-CM

## 2024-10-02 DIAGNOSIS — Z78.9 VEGAN DIET: ICD-10-CM

## 2024-10-02 DIAGNOSIS — Z23 NEED FOR VACCINATION: ICD-10-CM

## 2024-10-02 DIAGNOSIS — R68.82 LOW LIBIDO: ICD-10-CM

## 2024-10-02 PROCEDURE — 3074F SYST BP LT 130 MM HG: CPT

## 2024-10-02 PROCEDURE — 3078F DIAST BP <80 MM HG: CPT

## 2024-10-02 PROCEDURE — 90471 IMMUNIZATION ADMIN: CPT

## 2024-10-02 PROCEDURE — 90472 IMMUNIZATION ADMIN EACH ADD: CPT

## 2024-10-02 PROCEDURE — 90716 VAR VACCINE LIVE SUBQ: CPT

## 2024-10-02 PROCEDURE — 99214 OFFICE O/P EST MOD 30 MIN: CPT | Mod: 25

## 2024-10-02 PROCEDURE — 90656 IIV3 VACC NO PRSV 0.5 ML IM: CPT

## 2024-10-02 ASSESSMENT — ENCOUNTER SYMPTOMS
FEVER: 0
MYALGIAS: 0
HEADACHES: 0
WEAKNESS: 0
DIARRHEA: 0
ABDOMINAL PAIN: 0
CONSTIPATION: 0
VOMITING: 0
CHILLS: 0
DIZZINESS: 0
SHORTNESS OF BREATH: 0
BLURRED VISION: 0
COUGH: 0
NAUSEA: 0
WEIGHT LOSS: 0

## 2024-10-02 ASSESSMENT — PATIENT HEALTH QUESTIONNAIRE - PHQ9: CLINICAL INTERPRETATION OF PHQ2 SCORE: 0
